# Patient Record
Sex: MALE | Race: WHITE | NOT HISPANIC OR LATINO | Employment: FULL TIME | URBAN - METROPOLITAN AREA
[De-identification: names, ages, dates, MRNs, and addresses within clinical notes are randomized per-mention and may not be internally consistent; named-entity substitution may affect disease eponyms.]

---

## 2017-09-18 ENCOUNTER — TELEPHONE (OUTPATIENT)
Dept: FAMILY MEDICINE | Facility: CLINIC | Age: 37
End: 2017-09-18

## 2017-09-18 DIAGNOSIS — F32.A DEPRESSION, UNSPECIFIED DEPRESSION TYPE: Primary | ICD-10-CM

## 2017-09-18 NOTE — TELEPHONE ENCOUNTER
Patient has already been seen-please see referral--apparently insurance not paying since no referral is in  11/7/16 11/22/16 11/30/16 12/6/16 12/13/16 12/27/16 1/11/17

## 2017-09-20 NOTE — TELEPHONE ENCOUNTER
Dr Beltran, can you sign this referral for patient that I sent to psychology, his insurance requiring MD

## 2017-09-20 NOTE — TELEPHONE ENCOUNTER
Kamini Amezquita 4:14 PM      I can approve the referral, but the pt's claims will still be denied. He will need to see the MD first and the MD will need to send a treatment plan with the referral to Dr. Garcia, to [facility]. The pt will need an actual visit with Dr. Rahman.

## 2017-09-20 NOTE — TELEPHONE ENCOUNTER
This is the message I received from pre-service:    Thank you. I spoke with a rep at [facility]. His claims were denied, because he was not referred to see the LCSW by a MD. They will need a treatment plan from a MD referring the pt to Dr. Garcia and also using the same diagnosis. The back claims will not be payed until the treatment plan is on file. I am putting a detail note in the referral as well.         Pls advise. Referral pended for Dr Beltran signature if she agrees.--lp

## 2017-09-27 RX ORDER — SERTRALINE HYDROCHLORIDE 25 MG/1
TABLET, FILM COATED ORAL
Qty: 30 TABLET | Refills: 6 | Status: SHIPPED | OUTPATIENT
Start: 2017-09-27

## 2017-12-05 ENCOUNTER — CLINICAL SUPPORT (OUTPATIENT)
Dept: FAMILY MEDICINE | Facility: CLINIC | Age: 37
End: 2017-12-05
Payer: COMMERCIAL

## 2017-12-05 DIAGNOSIS — R68.89 FLU-LIKE SYMPTOMS: Primary | ICD-10-CM

## 2017-12-05 LAB
CTP QC/QA: YES
FLUAV AG NPH QL: NEGATIVE
FLUBV AG NPH QL: NEGATIVE

## 2017-12-05 PROCEDURE — 87804 INFLUENZA ASSAY W/OPTIC: CPT | Mod: ,,, | Performed by: FAMILY MEDICINE

## 2017-12-05 RX ORDER — PREDNISONE 20 MG/1
40 TABLET ORAL DAILY
Qty: 20 TABLET | Refills: 0 | Status: SHIPPED | OUTPATIENT
Start: 2017-12-05 | End: 2017-12-15

## 2017-12-05 NOTE — PROGRESS NOTES
2 pt identifiers used. Pt present for POCT flu. Collected specimen. Pt tolerated well. Result sent to provider.

## 2023-12-12 ENCOUNTER — OFFICE VISIT (OUTPATIENT)
Dept: OPTOMETRY | Facility: CLINIC | Age: 43
End: 2023-12-12

## 2023-12-12 DIAGNOSIS — H11.31 SUBCONJUNCTIVAL HEMORRHAGE OF RIGHT EYE: ICD-10-CM

## 2023-12-12 DIAGNOSIS — H11.421 CHEMOSIS OF RIGHT CONJUNCTIVA: Primary | ICD-10-CM

## 2023-12-12 PROCEDURE — 99203 OFFICE O/P NEW LOW 30 MIN: CPT | Mod: S$PBB,,,

## 2023-12-12 PROCEDURE — 99999 PR PBB SHADOW E&M-NEW PATIENT-LVL III: CPT | Mod: PBBFAC,,,

## 2023-12-12 PROCEDURE — 99203 PR OFFICE/OUTPT VISIT, NEW, LEVL III, 30-44 MIN: ICD-10-PCS | Mod: S$PBB,,,

## 2023-12-12 PROCEDURE — 99203 OFFICE O/P NEW LOW 30 MIN: CPT | Mod: PBBFAC,PO

## 2023-12-12 PROCEDURE — 99999 PR PBB SHADOW E&M-NEW PATIENT-LVL III: ICD-10-PCS | Mod: PBBFAC,,,

## 2023-12-12 RX ORDER — PREDNISOLONE ACETATE 10 MG/ML
1 SUSPENSION/ DROPS OPHTHALMIC 4 TIMES DAILY
Qty: 5 ML | Refills: 0 | Status: SHIPPED | OUTPATIENT
Start: 2023-12-12 | End: 2023-12-19

## 2023-12-12 NOTE — PROGRESS NOTES
HPI    Pt here for urgent visit after coming home from work last night and seeing   blood shot eye.    Pain to touch, with excessive watering. Pt had wife look and noted   possible scratch. Blurred VA this morning that cleared and slight   headache. Denies flashes and floaters. Pt denies CL wear.  Last edited by Josey Mckeon, OD on 12/12/2023  9:29 AM.            Assessment /Plan     For exam results, see Encounter Report.    Chemosis of right conjunctiva  -     prednisoLONE acetate (PRED FORTE) 1 % DrpS; Place 1 drop into the right eye 4 (four) times daily. for 7 days  Dispense: 5 mL; Refill: 0    Subconjunctival hemorrhage of right eye      1-2. Subconjunctival hemorrhage with chemosis of conjunctiva temporal OD. Pt denies any strenuous activity/valsalva. States he was working around debris and felt like something got into his eye. No foreign body on lid eversion. No staining of the cornea or cells/flare. Ed pt on findings and Rx'd Pred Forte to use QID OD x 7 days. Advised cool compresses and artificial tears for increased comfort. Ed pt that subconjunctival hemorrhage is self-limiting and may take 1-2 weeks to fully resolve. Ed pt to RTC asap if changes in vision or worsening signs/symptoms.     RTC: in-office prn.

## 2024-12-24 ENCOUNTER — OFFICE VISIT (OUTPATIENT)
Dept: FAMILY MEDICINE | Facility: CLINIC | Age: 44
End: 2024-12-24
Payer: COMMERCIAL

## 2024-12-24 VITALS
DIASTOLIC BLOOD PRESSURE: 92 MMHG | TEMPERATURE: 98 F | RESPIRATION RATE: 16 BRPM | HEIGHT: 66 IN | SYSTOLIC BLOOD PRESSURE: 156 MMHG | WEIGHT: 182.13 LBS | BODY MASS INDEX: 29.27 KG/M2 | OXYGEN SATURATION: 98 % | HEART RATE: 100 BPM

## 2024-12-24 DIAGNOSIS — Z11.4 ENCOUNTER FOR SCREENING FOR HIV: ICD-10-CM

## 2024-12-24 DIAGNOSIS — Z11.59 NEED FOR HEPATITIS C SCREENING TEST: ICD-10-CM

## 2024-12-24 DIAGNOSIS — Z00.00 LABORATORY EXAM ORDERED AS PART OF ROUTINE GENERAL MEDICAL EXAMINATION: ICD-10-CM

## 2024-12-24 DIAGNOSIS — Z00.00 ROUTINE PHYSICAL EXAMINATION: Primary | ICD-10-CM

## 2024-12-24 DIAGNOSIS — I10 HYPERTENSION, UNSPECIFIED TYPE: ICD-10-CM

## 2024-12-24 PROCEDURE — 1159F MED LIST DOCD IN RCRD: CPT | Mod: CPTII,S$GLB,, | Performed by: NURSE PRACTITIONER

## 2024-12-24 PROCEDURE — 3080F DIAST BP >= 90 MM HG: CPT | Mod: CPTII,S$GLB,, | Performed by: NURSE PRACTITIONER

## 2024-12-24 PROCEDURE — 3077F SYST BP >= 140 MM HG: CPT | Mod: CPTII,S$GLB,, | Performed by: NURSE PRACTITIONER

## 2024-12-24 PROCEDURE — 3008F BODY MASS INDEX DOCD: CPT | Mod: CPTII,S$GLB,, | Performed by: NURSE PRACTITIONER

## 2024-12-24 PROCEDURE — 99386 PREV VISIT NEW AGE 40-64: CPT | Mod: S$GLB,,, | Performed by: NURSE PRACTITIONER

## 2024-12-24 RX ORDER — VALSARTAN 80 MG/1
80 TABLET ORAL DAILY
Qty: 90 TABLET | Refills: 3 | Status: SHIPPED | OUTPATIENT
Start: 2024-12-24 | End: 2025-12-24

## 2024-12-24 NOTE — PROGRESS NOTES
"Patient ID: Jan Vega Jr. is a 44 y.o. male.     History of Present Illness    CHIEF COMPLAINT:  Patient presents today for elevated blood pressure/regular checkup/physical.    HYPERTENSION:  He reports elevated blood pressure for the past three years. He does not have a blood pressure monitoring device at home. asymptomatic        ROS:  General: -fever, -chills, -fatigue, -weight gain, -weight loss  Eyes: -vision changes, -redness, -discharge  ENT: -ear pain, -nasal congestion, -sore throat  Cardiovascular: -chest pain, +palpitations, -lower extremity edema  Respiratory: -cough, -shortness of breath  Gastrointestinal: -abdominal pain, -nausea, -vomiting, -diarrhea, -constipation, -blood in stool  Genitourinary: -dysuria, -hematuria, -frequency  Musculoskeletal: -joint pain, -muscle pain  Skin: -rash, -lesion  Neurological: -headache, -dizziness, -numbness, -tingling, -tremors  Psychiatric: +anxiety, -depression, -sleep difficulty         Physical Exam    Vitals:    12/24/24 0849   BP: (!) 156/92   Pulse: 100   Resp: 16   Temp: 98.4 °F (36.9 °C)   TempSrc: Oral   SpO2: 98%   Weight: 82.6 kg (182 lb 1.6 oz)   Height: 5' 6" (1.676 m)   PainSc: 0-No pain     Body mass index is 29.39 kg/m².  Physical Exam    Vitals: Blood pressure: 155/86. Tachycardia.  General: No acute distress. Well-developed. Well-nourished.  Eyes:. Sclerae anicteric.  HENT: Normocephalic. Atraumatic. Nares patent. Moist oral mucosa.  Ears: Bilateral TMs clear. Bilateral EACs clear.  Cardiovascular: Regular rate. Regular rhythm. No murmurs. No rubs. No gallops. Normal S1, S2.  Respiratory: Normal respiratory effort. Clear to auscultation bilaterally. No rales. No rhonchi. No wheezing.  Abdomen: Soft. Non-tender. Non-distended. Normoactive bowel sounds.  Musculoskeletal: No  obvious deformity.  Extremities: No lower extremity edema.  Neurological: Alert & oriented x3. No slurred speech. Normal gait.  Psychiatric: Normal mood. Normal " affect. Good insight. Good judgment.  Skin: Warm. Dry. No rash.                  Assessment & Plan    IMPRESSION:  - Assessed patient's hypertension, noting persistently elevated blood pressure over the past 3 years  - Considered anxiety as a potential contributing factor to elevated blood pressure and physical symptoms  - Will initiate antihypertensive medication and reassess in 2 weeks before considering anxiety treatment  - Planned to titrate blood pressure medication dosage if needed after initial follow-up    HYPERTENSION:  - Started blood pressure medication.  CBC  CMP  LIPIDS  HGA1C  TSH     - Follow up in 2 weeks for blood pressure check and potential medication adjustment.  - If blood pressure remains elevated at 2-week follow-up, medication dose will be increased.  - After appropriate blood pressure control is achieved, will provide a letter clearing patient for CDL    FOLLOW-UP:  - Patient to schedule follow-up visit around work schedule.             This note was generated with the assistance of ambient listening technology. Verbal consent was obtained by the patient and accompanying visitor(s) for the recording of patient appointment to facilitate this note. I attest to having reviewed and edited the generated note for accuracy, though some syntax or spelling errors may persist. Please contact the author of this note for any clarification.

## 2024-12-27 ENCOUNTER — LAB VISIT (OUTPATIENT)
Dept: LAB | Facility: HOSPITAL | Age: 44
End: 2024-12-27
Attending: NURSE PRACTITIONER
Payer: COMMERCIAL

## 2024-12-27 DIAGNOSIS — Z11.59 NEED FOR HEPATITIS C SCREENING TEST: ICD-10-CM

## 2024-12-27 DIAGNOSIS — Z11.4 ENCOUNTER FOR SCREENING FOR HIV: ICD-10-CM

## 2024-12-27 DIAGNOSIS — Z00.00 LABORATORY EXAM ORDERED AS PART OF ROUTINE GENERAL MEDICAL EXAMINATION: ICD-10-CM

## 2024-12-27 LAB
ALBUMIN SERPL BCP-MCNC: 4.5 G/DL (ref 3.5–5.2)
ALP SERPL-CCNC: 58 U/L (ref 40–150)
ALT SERPL W/O P-5'-P-CCNC: 51 U/L (ref 10–44)
ANION GAP SERPL CALC-SCNC: 10 MMOL/L (ref 8–16)
AST SERPL-CCNC: 44 U/L (ref 10–40)
BASOPHILS # BLD AUTO: 0.04 K/UL (ref 0–0.2)
BASOPHILS NFR BLD: 0.7 % (ref 0–1.9)
BILIRUB SERPL-MCNC: 0.9 MG/DL (ref 0.1–1)
BUN SERPL-MCNC: 12 MG/DL (ref 6–20)
CALCIUM SERPL-MCNC: 10.1 MG/DL (ref 8.7–10.5)
CHLORIDE SERPL-SCNC: 98 MMOL/L (ref 95–110)
CHOLEST SERPL-MCNC: 238 MG/DL (ref 120–199)
CHOLEST/HDLC SERPL: 2.1 {RATIO} (ref 2–5)
CO2 SERPL-SCNC: 29 MMOL/L (ref 23–29)
CREAT SERPL-MCNC: 0.9 MG/DL (ref 0.5–1.4)
DIFFERENTIAL METHOD BLD: ABNORMAL
EOSINOPHIL # BLD AUTO: 0.1 K/UL (ref 0–0.5)
EOSINOPHIL NFR BLD: 1.2 % (ref 0–8)
ERYTHROCYTE [DISTWIDTH] IN BLOOD BY AUTOMATED COUNT: 12.7 % (ref 11.5–14.5)
EST. GFR  (NO RACE VARIABLE): >60 ML/MIN/1.73 M^2
ESTIMATED AVG GLUCOSE: 91 MG/DL (ref 68–131)
GLUCOSE SERPL-MCNC: 104 MG/DL (ref 70–110)
HBA1C MFR BLD: 4.8 % (ref 4–5.6)
HCT VFR BLD AUTO: 46.8 % (ref 40–54)
HCV AB SERPL QL IA: NORMAL
HDLC SERPL-MCNC: 112 MG/DL (ref 40–75)
HDLC SERPL: 47.1 % (ref 20–50)
HGB BLD-MCNC: 16.1 G/DL (ref 14–18)
HIV 1+2 AB+HIV1 P24 AG SERPL QL IA: NORMAL
IMM GRANULOCYTES # BLD AUTO: 0.02 K/UL (ref 0–0.04)
IMM GRANULOCYTES NFR BLD AUTO: 0.3 % (ref 0–0.5)
LDLC SERPL CALC-MCNC: 116.8 MG/DL (ref 63–159)
LYMPHOCYTES # BLD AUTO: 1.9 K/UL (ref 1–4.8)
LYMPHOCYTES NFR BLD: 31.6 % (ref 18–48)
MCH RBC QN AUTO: 33 PG (ref 27–31)
MCHC RBC AUTO-ENTMCNC: 34.4 G/DL (ref 32–36)
MCV RBC AUTO: 96 FL (ref 82–98)
MONOCYTES # BLD AUTO: 0.7 K/UL (ref 0.3–1)
MONOCYTES NFR BLD: 12.6 % (ref 4–15)
NEUTROPHILS # BLD AUTO: 3.2 K/UL (ref 1.8–7.7)
NEUTROPHILS NFR BLD: 53.6 % (ref 38–73)
NONHDLC SERPL-MCNC: 126 MG/DL
NRBC BLD-RTO: 0 /100 WBC
PLATELET # BLD AUTO: 270 K/UL (ref 150–450)
PMV BLD AUTO: 9.7 FL (ref 9.2–12.9)
POTASSIUM SERPL-SCNC: 4.8 MMOL/L (ref 3.5–5.1)
PROT SERPL-MCNC: 7.9 G/DL (ref 6–8.4)
RBC # BLD AUTO: 4.88 M/UL (ref 4.6–6.2)
SODIUM SERPL-SCNC: 137 MMOL/L (ref 136–145)
T4 FREE SERPL-MCNC: 0.77 NG/DL (ref 0.71–1.51)
TRIGL SERPL-MCNC: 46 MG/DL (ref 30–150)
TSH SERPL DL<=0.005 MIU/L-ACNC: 4.61 UIU/ML (ref 0.4–4)
WBC # BLD AUTO: 5.89 K/UL (ref 3.9–12.7)

## 2024-12-27 PROCEDURE — 80061 LIPID PANEL: CPT | Performed by: NURSE PRACTITIONER

## 2024-12-27 PROCEDURE — 80053 COMPREHEN METABOLIC PANEL: CPT | Performed by: NURSE PRACTITIONER

## 2024-12-27 PROCEDURE — 85025 COMPLETE CBC W/AUTO DIFF WBC: CPT | Performed by: NURSE PRACTITIONER

## 2024-12-27 PROCEDURE — 84443 ASSAY THYROID STIM HORMONE: CPT | Performed by: NURSE PRACTITIONER

## 2024-12-27 PROCEDURE — 84439 ASSAY OF FREE THYROXINE: CPT | Performed by: NURSE PRACTITIONER

## 2024-12-27 PROCEDURE — 86803 HEPATITIS C AB TEST: CPT | Performed by: NURSE PRACTITIONER

## 2024-12-27 PROCEDURE — 36415 COLL VENOUS BLD VENIPUNCTURE: CPT | Mod: PO | Performed by: NURSE PRACTITIONER

## 2024-12-27 PROCEDURE — 83036 HEMOGLOBIN GLYCOSYLATED A1C: CPT | Performed by: NURSE PRACTITIONER

## 2024-12-27 PROCEDURE — 87389 HIV-1 AG W/HIV-1&-2 AB AG IA: CPT | Performed by: NURSE PRACTITIONER

## 2025-01-02 DIAGNOSIS — R79.89 ELEVATED LFTS: Primary | ICD-10-CM

## 2025-01-02 NOTE — PROGRESS NOTES
Labs showed elevated cholesterol but HDL (good cholesterol) very high so no worries at this time    LFT's slightly elevated  Reduce alcohol intake and recheck in 3 months

## 2025-01-03 ENCOUNTER — TELEPHONE (OUTPATIENT)
Dept: FAMILY MEDICINE | Facility: CLINIC | Age: 45
End: 2025-01-03
Payer: COMMERCIAL

## 2025-01-10 ENCOUNTER — TELEPHONE (OUTPATIENT)
Dept: FAMILY MEDICINE | Facility: CLINIC | Age: 45
End: 2025-01-10

## 2025-01-10 ENCOUNTER — CLINICAL SUPPORT (OUTPATIENT)
Dept: FAMILY MEDICINE | Facility: CLINIC | Age: 45
End: 2025-01-10
Payer: COMMERCIAL

## 2025-01-10 VITALS — SYSTOLIC BLOOD PRESSURE: 138 MMHG | DIASTOLIC BLOOD PRESSURE: 92 MMHG | HEART RATE: 84 BPM

## 2025-01-10 DIAGNOSIS — I10 HYPERTENSION, UNSPECIFIED TYPE: Primary | ICD-10-CM

## 2025-01-10 RX ORDER — VALSARTAN 160 MG/1
160 TABLET ORAL DAILY
Qty: 90 TABLET | Refills: 3 | Status: SHIPPED | OUTPATIENT
Start: 2025-01-10 | End: 2026-01-10

## 2025-01-10 NOTE — PROGRESS NOTES
BP: (!) 138/92 , Pulse: 84    Blood pressure reading after 15 minutes don548/98, Pulse 98.  Eliza Bailon NP notified.    Jan Vega Jr. 44 y.o. male is here today for Blood Pressure check.   History of HTN yes.    Review of patient's allergies indicates:  No Known Allergies  Creatinine   Date Value Ref Range Status   12/27/2024 0.9 0.5 - 1.4 mg/dL Final     Sodium   Date Value Ref Range Status   12/27/2024 137 136 - 145 mmol/L Final     Potassium   Date Value Ref Range Status   12/27/2024 4.8 3.5 - 5.1 mmol/L Final   ]  Patient verifies taking blood pressure medications on a regular basis at the same time of the day. 6am    Current Outpatient Medications:     ASHWAGANDHA EXTRACT ORAL, Take 180 mg by mouth 2 (two) times daily., Disp: , Rfl:     ibuprofen (ADVIL,MOTRIN) 200 MG tablet, Take 200 mg by mouth as needed for Pain., Disp: , Rfl:     TURMERIC ORAL, Take 360 mg by mouth 2 (two) times daily., Disp: , Rfl:     valsartan (DIOVAN) 80 MG tablet, Take 1 tablet (80 mg total) by mouth once daily., Disp: 90 tablet, Rfl: 3  Does patient have record of home blood pressure readings yes. Readings have been averaging 1-5-25 148/90 108 1-6-25 142/93 88 1-7-25 139/92 83 1-8-25 139/88 81 1-9-25 124/89 86 1-10-25 122/82 93.   Last dose of blood pressure medication was taken at 6am.  Patient is symptomatic.   Complains of  having blurred vision at times when pressure is elevated .  Pt reports that wife informed pt to start taking medication at the same time everyday. Pt reports starting that on Tuesday.

## 2025-01-10 NOTE — TELEPHONE ENCOUNTER
BP: (!) 138/92 , Pulse: 84     Blood pressure reading after 15 minutes fgs165/98, Pulse 98.  Eliza Bailon NP notified.     Jan Vega Jr. 44 y.o. male is here today for Blood Pressure check.   History of HTN yes.     Review of patient's allergies indicates:  No Known Allergies        Creatinine   Date Value Ref Range Status   12/27/2024 0.9 0.5 - 1.4 mg/dL Final            Sodium   Date Value Ref Range Status   12/27/2024 137 136 - 145 mmol/L Final            Potassium   Date Value Ref Range Status   12/27/2024 4.8 3.5 - 5.1 mmol/L Final   ]  Patient verifies taking blood pressure medications on a regular basis at the same time of the day. 6am    Current Medications      Current Outpatient Medications:     ASHWAGANDHA EXTRACT ORAL, Take 180 mg by mouth 2 (two) times daily., Disp: , Rfl:     ibuprofen (ADVIL,MOTRIN) 200 MG tablet, Take 200 mg by mouth as needed for Pain., Disp: , Rfl:     TURMERIC ORAL, Take 360 mg by mouth 2 (two) times daily., Disp: , Rfl:     valsartan (DIOVAN) 80 MG tablet, Take 1 tablet (80 mg total) by mouth once daily., Disp: 90 tablet, Rfl: 3     Does patient have record of home blood pressure readings yes. Readings have been averaging 1-5-25 148/90 108 1-6-25 142/93 88 1-7-25 139/92 83 1-8-25 139/88 81 1-9-25 124/89 86 1-10-25 122/82 93.   Last dose of blood pressure medication was taken at 6am.  Patient is symptomatic.   Complains of  having blurred vision at times when pressure is elevated .  Pt reports that wife informed pt to start taking medication at the same time everyday. Pt reports starting that on     
Increase diovan to 160 mg daily  Continue to monitor BP     Notify me if top # above 140 or bottom # above 90 for more than 1 reading, if we see a trend  
0

## 2025-03-13 ENCOUNTER — TELEPHONE (OUTPATIENT)
Dept: FAMILY MEDICINE | Facility: CLINIC | Age: 45
End: 2025-03-13
Payer: COMMERCIAL

## 2025-03-13 RX ORDER — VALSARTAN 320 MG/1
320 TABLET ORAL DAILY
Qty: 90 TABLET | Refills: 3 | Status: SHIPPED | OUTPATIENT
Start: 2025-03-13 | End: 2026-03-13

## 2025-03-17 ENCOUNTER — CLINICAL SUPPORT (OUTPATIENT)
Dept: FAMILY MEDICINE | Facility: CLINIC | Age: 45
End: 2025-03-17
Payer: COMMERCIAL

## 2025-03-17 VITALS — DIASTOLIC BLOOD PRESSURE: 86 MMHG | SYSTOLIC BLOOD PRESSURE: 134 MMHG | HEART RATE: 100 BPM | OXYGEN SATURATION: 98 %

## 2025-03-17 DIAGNOSIS — I10 HYPERTENSION, UNSPECIFIED TYPE: Primary | ICD-10-CM

## 2025-03-17 NOTE — PROGRESS NOTES
Jan Vega Jr. 45 y.o. male is here today for Blood Pressure check.   History of HTN yes.    Review of patient's allergies indicates:  No Known Allergies  Creatinine   Date Value Ref Range Status   12/27/2024 0.9 0.5 - 1.4 mg/dL Final     Sodium   Date Value Ref Range Status   12/27/2024 137 136 - 145 mmol/L Final     Potassium   Date Value Ref Range Status   12/27/2024 4.8 3.5 - 5.1 mmol/L Final   ]  Patient verifies taking blood pressure medications on a regular basis at the same time of the day.   Current Medications[1]  Does patient have record of home blood pressure readings no. Readings have been averaging  Last dose of blood pressure medication was taken at 6:30 am.  Patient is asymptomatic.     Blood pressure reading was 134/86, Pulse 100.      NEFTALI Bailon notified.            [1]   Current Outpatient Medications:     ASHWAGANDHA EXTRACT ORAL, Take 180 mg by mouth 2 (two) times daily., Disp: , Rfl:     ibuprofen (ADVIL,MOTRIN) 200 MG tablet, Take 200 mg by mouth as needed for Pain., Disp: , Rfl:     TURMERIC ORAL, Take 360 mg by mouth 2 (two) times daily., Disp: , Rfl:     valsartan (DIOVAN) 320 MG tablet, Take 1 tablet (320 mg total) by mouth once daily., Disp: 90 tablet, Rfl: 3

## 2025-03-17 NOTE — LETTER
March 17, 2025    Jan Vega Jr.  [address] [place] [address]             [facility]  [address] [address]  [place] [address]  Phone: [phone]  Fax: [phone]   March 17, 2025     Patient: Jan Vega Jr.   YOB: 1980   Date of Visit: 3/17/2025       To Whom it May Concern:    Jan Vega was seen in my clinic on 3/17/2025. His blood pressure is well controlled on Valsartan 320 mg daily.    If you have any questions or concerns, please don't hesitate to call.    Sincerely,         Chanel Bailon, NP